# Patient Record
Sex: FEMALE | Race: WHITE | URBAN - METROPOLITAN AREA
[De-identification: names, ages, dates, MRNs, and addresses within clinical notes are randomized per-mention and may not be internally consistent; named-entity substitution may affect disease eponyms.]

---

## 2018-06-13 ENCOUNTER — TELEPHONE (OUTPATIENT)
Dept: SURGERY | Facility: CLINIC | Age: 28
End: 2018-06-13

## 2018-06-13 NOTE — TELEPHONE ENCOUNTER
PREVISIT INFORMATION                                                    Lidia S Scooby scheduled for future visit at [facility] specialty clinics.    Patient is scheduled to see Dr. Snyder on 6/28/18  Reason for visit: 2nd opinion for thyroid nodules  Referring provider   Has patient seen previous specialist? Yes.   Clinic/Facility [facility].   Medical Records:  Care everywhere     REVIEW                                                      New patient packet mailed to patient: Yes  Medication reconciliation complete: No      Current Outpatient Prescriptions   Medication Sig Dispense Refill     NO ACTIVE MEDICATIONS          Allergies: Review of patient's allergies indicates no known allergies.      PLAN/FOLLOW-UP NEEDED                                                      Called patient and left a message on  with date and time of upcoming appointment, along with number to return call if he needs to cancel or reschedule appointment or if records need to be obtained outside of system.      Patient Reminders Given:  Please, make sure you bring an updated list of your medications.   If you are having a procedure, please, present 15 minutes early.  If you need to cancel or reschedule,please call [phone].    Isabelle Ibarra

## 2018-06-28 ENCOUNTER — OFFICE VISIT (OUTPATIENT)
Dept: SURGERY | Facility: CLINIC | Age: 28
End: 2018-06-28
Payer: COMMERCIAL

## 2018-06-28 VITALS
WEIGHT: 174.8 LBS | HEART RATE: 58 BPM | SYSTOLIC BLOOD PRESSURE: 116 MMHG | BODY MASS INDEX: 27.38 KG/M2 | DIASTOLIC BLOOD PRESSURE: 80 MMHG | OXYGEN SATURATION: 98 %

## 2018-06-28 DIAGNOSIS — E04.1 THYROID NODULE: Primary | ICD-10-CM

## 2018-06-28 PROCEDURE — 99203 OFFICE O/P NEW LOW 30 MIN: CPT | Performed by: SURGERY

## 2018-06-28 ASSESSMENT — PAIN SCALES - GENERAL: PAINLEVEL: NO PAIN (0)

## 2018-06-28 NOTE — NURSING NOTE
Lidia Almodovar's goals for this visit include:   Chief Complaint   Patient presents with     Consult     2nd opinion- Thyroid nodule       She requests these members of her care team be copied on today's visit information: Yes    PCP: Randolph Goetz    Referring Provider:  Referred Self, MD  No address on file    /80 (BP Location: Left arm, Patient Position: Sitting, Cuff Size: Adult Regular)  Pulse 58  Wt 79.3 kg (174 lb 12.8 oz)  SpO2 98%  BMI 27.38 kg/m2    Do you need any medication refills at today's visit? No

## 2018-06-28 NOTE — LETTER
6/28/2018         RE: Lidia Almodovar  [address] [facility] [address]        Dear Colleague,    Thank you for referring your patient, Lidia Almodovar, to the [facility]. Please see a copy of my visit note below.    Visit Date:   06/28/2018      REASON FOR CONSULTATION:  Greater than 30 minutes was spent in the care and consultation of this patient, of which 50% was in consultation for evaluation for surgical options for a thyroid nodule.      HISTORY OF PRESENT ILLNESS:  This is a 28-year-old female who presents today with her mother present.  She was undergoing a CT scan of the head and neck area and identified, what was described, as a 1.1 cm right thyroid nodule.  This then led to an ultrasound that actually only identified a 0.7 cm right thyroid nodule.  Fine needle aspiration biopsy of this thyroid nodule was performed in 4/2018 that was suspicious for Hurthle cell neoplasm.  This was then further sent out for RNA testing with the [facility] RNA analysis.  The RNA analysis did not identify any mutations present.  Her thyroid function tests reveal the patient is euthyroid with a TSH of 2.13.      Regarding this thyroid nodule, the patient denies any symptoms of hypo or hyperthyroidism.  She has no head, neck radiation exposure.  No previous history of thyroid carcinoma.  No family history of thyroid carcinoma.      Regarding the symptoms, the patient denies any obvious problems with voice quality, inspiration or swallowing.  She did intermittently state that she may have felt some swelling in the neck area.      Regarding the swelling, in fact, that is what led to the initial CT scan.  However, where she points where the concern of the swelling is, it is in the submandibular area and slightly lateral to it, clearly above where the thyroid gland is.      PAST MEDICAL HISTORY:  He has obsessive-compulsive disorder and Tourette's syndrome.      PAST SURGICAL HISTORY:  None.       MEDICATIONS:  Have been reviewed and are available in the EMR.      REVIEW OF SYSTEMS:  A 10-point review of systems is pertinent for that noted in HPI.      PHYSICAL EXAMINATION:  Concentrating in the head and neck area, particularly at the level of the mandible where the patient states that she has swelling, there are normal submandibular glands.  No obvious cervical lymphadenopathy.  There is no evidence of masses in this area.      On palpating the  thyroid, the thyroid gland is palpable.  I can actually feel a very small subtle nodule in the right lobe of the thyroid midportion of it.  The superior and inferior borders of the thyroid gland are palpable.  There are no other lesions there that are identified.      The radiographic images are discussed above and are available in the EMR.  It identified a 0.7 cm well-circumscribed nodule within the right lobe of the thyroid gland on ultrasound.      LABORATORY DATA:  Discussed above.      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      PLAN:  I had a long discussion with the patient and her mother that this nodule is very unlikely causing her symptoms that she is complaining of.  The options would include to repeat the ultrasound in 6 months to a year, followed by a biopsy.  I do not find an urgent need to undergo a diagnostic right thyroid lobectomy and isthmusectomy at this time.  However, if further concerns should arise, I am happy to reevaluate the patient.         AVA BEASLEY MD             D: 2018   T: 2018   MT: ZITA      Name:     NANI KIM   MRN:      [id number]        Account:      [id number]   :      1990           Visit Date:   2018      Document: [id number]       Again, thank you for allowing me to participate in the care of your patient.        Sincerely,        Ava Beasley MD

## 2018-07-12 NOTE — PROGRESS NOTES
Visit Date:   06/28/2018      REASON FOR CONSULTATION:  Greater than 30 minutes was spent in the care and consultation of this patient, of which 50% was in consultation for evaluation for surgical options for a thyroid nodule.      HISTORY OF PRESENT ILLNESS:  This is a 28-year-old female who presents today with her mother present.  She was undergoing a CT scan of the head and neck area and identified, what was described, as a 1.1 cm right thyroid nodule.  This then led to an ultrasound that actually only identified a 0.7 cm right thyroid nodule.  Fine needle aspiration biopsy of this thyroid nodule was performed in 4/2018 that was suspicious for Hurthle cell neoplasm.  This was then further sent out for RNA testing with the [place] RNA analysis.  The RNA analysis did not identify any mutations present.  Her thyroid function tests reveal the patient is euthyroid with a TSH of 2.13.      Regarding this thyroid nodule, the patient denies any symptoms of hypo or hyperthyroidism.  She has no head, neck radiation exposure.  No previous history of thyroid carcinoma.  No family history of thyroid carcinoma.      Regarding the symptoms, the patient denies any obvious problems with voice quality, inspiration or swallowing.  She did intermittently state that she may have felt some swelling in the neck area.      Regarding the swelling, in fact, that is what led to the initial CT scan.  However, where she points where the concern of the swelling is, it is in the submandibular area and slightly lateral to it, clearly above where the thyroid gland is.      PAST MEDICAL HISTORY:  He has obsessive-compulsive disorder and Tourette's syndrome.      PAST SURGICAL HISTORY:  None.      MEDICATIONS:  Have been reviewed and are available in the EMR.      REVIEW OF SYSTEMS:  A 10-point review of systems is pertinent for that noted in HPI.      PHYSICAL EXAMINATION:  Concentrating in the head and neck area, particularly at the level of the  mandible where the patient states that she has swelling, there are normal submandibular glands.  No obvious cervical lymphadenopathy.  There is no evidence of masses in this area.      On palpating the  thyroid, the thyroid gland is palpable.  I can actually feel a very small subtle nodule in the right lobe of the thyroid midportion of it.  The superior and inferior borders of the thyroid gland are palpable.  There are no other lesions there that are identified.      The radiographic images are discussed above and are available in the EMR.  It identified a 0.7 cm well-circumscribed nodule within the right lobe of the thyroid gland on ultrasound.      LABORATORY DATA:  Discussed above.      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      PLAN:  I had a long discussion with the patient and her mother that this nodule is very unlikely causing her symptoms that she is complaining of.  The options would include to repeat the ultrasound in 6 months to a year, followed by a biopsy.  I do not find an urgent need to undergo a diagnostic right thyroid lobectomy and isthmusectomy at this time.  However, if further concerns should arise, I am happy to reevaluate the patient.         OPAL BEASLEY MD             D: 2018   T: 2018   MT: ZITA      Name:     NANI KIM   MRN:      [id number]        Account:      [id number]   :      1990           Visit Date:   2018      Document: [id number]